# Patient Record
Sex: MALE | Race: WHITE | NOT HISPANIC OR LATINO | Employment: FULL TIME | URBAN - METROPOLITAN AREA
[De-identification: names, ages, dates, MRNs, and addresses within clinical notes are randomized per-mention and may not be internally consistent; named-entity substitution may affect disease eponyms.]

---

## 2019-05-22 ENCOUNTER — LAB VISIT (OUTPATIENT)
Dept: LAB | Facility: HOSPITAL | Age: 29
End: 2019-05-22
Payer: COMMERCIAL

## 2019-05-22 ENCOUNTER — OFFICE VISIT (OUTPATIENT)
Dept: GASTROENTEROLOGY | Facility: CLINIC | Age: 29
End: 2019-05-22
Payer: COMMERCIAL

## 2019-05-22 VITALS
HEIGHT: 70 IN | BODY MASS INDEX: 24.52 KG/M2 | SYSTOLIC BLOOD PRESSURE: 100 MMHG | DIASTOLIC BLOOD PRESSURE: 62 MMHG | WEIGHT: 171.31 LBS | HEART RATE: 64 BPM

## 2019-05-22 DIAGNOSIS — B18.2 CHRONIC HEPATITIS C WITHOUT HEPATIC COMA: Primary | ICD-10-CM

## 2019-05-22 DIAGNOSIS — B18.2 CHRONIC HEPATITIS C WITHOUT HEPATIC COMA: ICD-10-CM

## 2019-05-22 LAB
ALBUMIN SERPL BCP-MCNC: 4.2 G/DL (ref 3.5–5.2)
ALP SERPL-CCNC: 60 U/L (ref 55–135)
ALT SERPL W/O P-5'-P-CCNC: 133 U/L (ref 10–44)
AST SERPL-CCNC: 68 U/L (ref 10–40)
BILIRUB DIRECT SERPL-MCNC: 0.5 MG/DL (ref 0.1–0.3)
BILIRUB SERPL-MCNC: 0.8 MG/DL (ref 0.1–1)
INR PPP: 1 (ref 0.8–1.2)
PROT SERPL-MCNC: 7.9 G/DL (ref 6–8.4)
PROTHROMBIN TIME: 10.4 SEC (ref 9–12.5)

## 2019-05-22 PROCEDURE — 86706 HEP B SURFACE ANTIBODY: CPT

## 2019-05-22 PROCEDURE — 99204 PR OFFICE/OUTPT VISIT, NEW, LEVL IV, 45-59 MIN: ICD-10-PCS | Mod: S$GLB,,, | Performed by: NURSE PRACTITIONER

## 2019-05-22 PROCEDURE — 36415 COLL VENOUS BLD VENIPUNCTURE: CPT

## 2019-05-22 PROCEDURE — 86790 VIRUS ANTIBODY NOS: CPT

## 2019-05-22 PROCEDURE — 99204 OFFICE O/P NEW MOD 45 MIN: CPT | Mod: S$GLB,,, | Performed by: NURSE PRACTITIONER

## 2019-05-22 PROCEDURE — 99999 PR PBB SHADOW E&M-NEW PATIENT-LVL III: CPT | Mod: PBBFAC,,, | Performed by: NURSE PRACTITIONER

## 2019-05-22 PROCEDURE — 86704 HEP B CORE ANTIBODY TOTAL: CPT

## 2019-05-22 PROCEDURE — 3008F BODY MASS INDEX DOCD: CPT | Mod: CPTII,S$GLB,, | Performed by: NURSE PRACTITIONER

## 2019-05-22 PROCEDURE — 99999 PR PBB SHADOW E&M-NEW PATIENT-LVL III: ICD-10-PCS | Mod: PBBFAC,,, | Performed by: NURSE PRACTITIONER

## 2019-05-22 PROCEDURE — 87340 HEPATITIS B SURFACE AG IA: CPT

## 2019-05-22 PROCEDURE — 3008F PR BODY MASS INDEX (BMI) DOCUMENTED: ICD-10-PCS | Mod: CPTII,S$GLB,, | Performed by: NURSE PRACTITIONER

## 2019-05-22 PROCEDURE — 80076 HEPATIC FUNCTION PANEL: CPT

## 2019-05-22 PROCEDURE — 85610 PROTHROMBIN TIME: CPT

## 2019-05-22 NOTE — PROGRESS NOTES
Clinic Consult:  [facility] Consultation Note    Reason for Consult:  The encounter diagnosis was Chronic hepatitis C without hepatic coma.    PCP: Nicholas Barone   [address] [facility] / [place] [address]    HPI:  This is a 29 y.o. male here for evaluation of the above  Pt states that he was diagnosed with HCV about 4 years ago, no treatment at that time.  He reports a hx of IVDU. No use in over 2 years  He is interested in treatment.  Denies any abdominal pain.  No nausea or vomiting.  No change in bowel pattern.  No melena or hematochezia. No weight loss.  No upper GI bleeding.  No ascites or BLE.  No overt confusion.      Review of Systems   Constitutional: Negative for chills, fever, malaise/fatigue and weight loss.   Respiratory: Negative for cough.    Cardiovascular: Negative for chest pain.   Gastrointestinal:        Per HPI   Musculoskeletal: Negative for myalgias.   Skin: Negative for itching and rash.   Neurological: Negative for headaches.   Psychiatric/Behavioral: The patient is not nervous/anxious.        Medical History:   Past Medical History:   Diagnosis Date    Overdose of illicit drug     2 years clean        Surgical History:  Past Surgical History:   Procedure Laterality Date    HERNIA MESH REMOVAL  2006    LEG SURGERY  2015       Family History:   History reviewed. No pertinent family history.    Social History:   Social History     Tobacco Use    Smoking status: Current Every Day Smoker     Types: Vaping with nicotine    Smokeless tobacco: Former User   Substance Use Topics    Alcohol use: Yes     Comment: rarely    Drug use: Not Currently     Comment: 2 years clean        Allergies: Reviewed    Home Medications:   Current Outpatient Medications on File Prior to Visit   Medication Sig Dispense Refill    fluticasone (FLONASE) 50 mcg/actuation nasal spray 1 spray (50 mcg total) by Each Nare route once daily. 1 Bottle 11    hydrocortisone (ANUSOL-HC) 25 mg suppository Place  "1 suppository (25 mg total) rectally 2 (two) times daily. 14 suppository 0     No current facility-administered medications on file prior to visit.        Physical Exam:  Vital Signs:  /62   Pulse 64   Ht 5' 10" (1.778 m)   Wt 77.7 kg (171 lb 4.8 oz)   BMI 24.58 kg/m²   Body mass index is 24.58 kg/m².  Physical Exam   Constitutional: He is oriented to person, place, and time. He appears well-developed and well-nourished.   HENT:   Head: Normocephalic.   Eyes: No scleral icterus.   Neck: Normal range of motion.   Cardiovascular: Normal rate and regular rhythm.   Pulmonary/Chest: Effort normal and breath sounds normal.   Abdominal: Soft. Bowel sounds are normal. He exhibits no distension. There is no tenderness.   Musculoskeletal: Normal range of motion.   Neurological: He is alert and oriented to person, place, and time.   Skin: Skin is warm and dry.   Psychiatric: He has a normal mood and affect.   Vitals reviewed.      Labs: Pertinent labs reviewed.  Assessment:  1. Chronic hepatitis C without hepatic coma         Recommendations:  ,Chronic hepatitis C without hepatic coma  - diagnosis, prognosis, progression, and treatment of HCV discussed  - will plan for additional labs and imaging for staging of liver disease  - Mavyret for 8 weeks sent to pharmacy.   - If previous exposure to Hep B, will need labs every 4 weeks while on treatment.  - Will need end of treatment labs and SVR labs 12 weeks last.   - questions and concerns addressed.   -     Hepatic function panel; Future; Expected date: 05/22/2019  -     Protime-INR; Future; Expected date: 05/22/2019  -     Hepatitis B surface antibody; Future; Expected date: 05/22/2019  -     Hepatitis B surface antigen; Future; Expected date: 05/22/2019  -     Hepatitis B core antibody, total; Future; Expected date: 05/22/2019  -     Hepatitis A antibody, IgG; Future; Expected date: 05/22/2019  -     US Abdomen Complete; Future; Expected date: 05/22/2019  -     US " Elastography Liver; Future; Expected date: 05/22/2019  -     glecaprevir-pibrentasvir (MAVYRET) 100-40 mg Tab; Take 3 tablets by mouth once daily.  Dispense: 84 tablet; Refill: 1        Follow up to be determined by results of above.        Thank you so much for allowing me to participate in the care of GUERITA Jose

## 2019-05-22 NOTE — LETTER
May 22, 2019      Fabrice Soliz MD  [address]  [facility] [address]           [facility]  [address] [facility] [address]  Phone: [phone]  Fax: [phone]          Patient: Eusebio Kwan   MR Number: [id number]   YOB: 1990   Date of Visit: 5/22/2019       Dear Dr. Fabrice Soliz:    Thank you for referring Eusebio Kwan to me for evaluation. Attached you will find relevant portions of my assessment and plan of care.    If you have questions, please do not hesitate to call me. I look forward to following Eusebio Kwan along with you.    Sincerely,    Johnna Ashraf, [facility]    Enclosure  CC:  No Recipients    If you would like to receive this communication electronically, please contact [email or web address] or [phone] to request more information on [facility] Link access.    For providers and/or their staff who would like to refer a patient to [facility], please contact us through our one-stop-shop provider referral line, [facility], at [phone].    If you feel you have received this communication in error or would no longer like to receive these types of communications, please e-mail [email or web address]

## 2019-05-23 ENCOUNTER — TELEPHONE (OUTPATIENT)
Dept: PHARMACY | Facility: CLINIC | Age: 29
End: 2019-05-23

## 2019-05-23 LAB
HBV CORE AB SERPL QL IA: NEGATIVE
HBV SURFACE AB SER-ACNC: POSITIVE M[IU]/ML
HBV SURFACE AG SERPL QL IA: NEGATIVE
HEPATITIS A ANTIBODY, IGG: NEGATIVE

## 2019-05-23 NOTE — TELEPHONE ENCOUNTER
Notified the patient we received the prescription for Mavyret, and it will require authorization from the insurance company. Patient voiced understanding.

## 2019-05-24 DIAGNOSIS — Z23 NEED FOR VACCINATION AGAINST HEPATITIS A: Primary | ICD-10-CM

## 2019-05-30 ENCOUNTER — DOCUMENTATION ONLY (OUTPATIENT)
Dept: TRANSPLANT | Facility: CLINIC | Age: 29
End: 2019-05-30

## 2019-05-30 NOTE — NURSING
Pt records reviewed.   Pt will be referred to Hepatitis C clinic  Hepatitis C virus carrier state  Initial referral received  from the [facility]que.   Referring Provider/diagnosis  Nicholas Barone MD    Referral letter sent to  patient.    mikal.

## 2019-05-30 NOTE — LETTER
May 30, 2019    Eusebio Kwan  [address] [facility] [facility] [address]      Dear Eusebio Kwan:    Your doctor has referred you to the [facility]. We are sending this letter to remind you to make an appointment with us to complete the referral process.     Please call us at [phone] to schedule an appointment. We look forward to seeing you soon.     If you received a call and have been scheduled, please disregard this letter.       Sincerely,        [facility]   [address] [place] [id number]  [phone]

## 2019-05-31 ENCOUNTER — PROCEDURE VISIT (OUTPATIENT)
Dept: GASTROENTEROLOGY | Facility: CLINIC | Age: 29
End: 2019-05-31
Attending: NURSE PRACTITIONER
Payer: COMMERCIAL

## 2019-05-31 ENCOUNTER — HOSPITAL ENCOUNTER (OUTPATIENT)
Dept: RADIOLOGY | Facility: HOSPITAL | Age: 29
Discharge: HOME | End: 2019-05-31
Attending: NURSE PRACTITIONER
Payer: COMMERCIAL

## 2019-05-31 VITALS
WEIGHT: 173.5 LBS | HEART RATE: 66 BPM | DIASTOLIC BLOOD PRESSURE: 60 MMHG | SYSTOLIC BLOOD PRESSURE: 90 MMHG | BODY MASS INDEX: 24.84 KG/M2 | HEIGHT: 70 IN

## 2019-05-31 DIAGNOSIS — B18.2 CHRONIC HEPATITIS C WITHOUT HEPATIC COMA: ICD-10-CM

## 2019-05-31 PROCEDURE — 91200 LIVER ELASTOGRAPHY: CPT | Mod: S$GLB,,, | Performed by: NURSE PRACTITIONER

## 2019-05-31 PROCEDURE — 76700 US ABDOMEN COMPLETE: ICD-10-PCS | Mod: 26,,, | Performed by: RADIOLOGY

## 2019-05-31 PROCEDURE — 91200 PR LIVER ELASTOGRAPHY W/OUT IMAG W/INTERP & REPORT: ICD-10-PCS | Mod: S$GLB,,, | Performed by: NURSE PRACTITIONER

## 2019-05-31 PROCEDURE — 76700 US EXAM ABDOM COMPLETE: CPT | Mod: TC

## 2019-05-31 PROCEDURE — 76700 US EXAM ABDOM COMPLETE: CPT | Mod: 26,,, | Performed by: RADIOLOGY

## 2019-06-05 NOTE — TELEPHONE ENCOUNTER
DOCUMENTATION ONLY:  Prior authorization for Mavyret approved from 06/04/2019 to 12/01/2019 x 8 weeks of treatment.   Case ID# [id number]    [facility]  [phone] (Phone)  [phone] (Fax)    Mavyret co-pay coupon card information:   BIN# [id number]  PCN# [id number]  GRP# [id number]  ID# [id number]

## 2019-06-06 ENCOUNTER — TELEPHONE (OUTPATIENT)
Dept: GASTROENTEROLOGY | Facility: CLINIC | Age: 29
End: 2019-06-06

## 2019-06-06 DIAGNOSIS — B18.2 CHRONIC HEPATITIS C WITHOUT HEPATIC COMA: ICD-10-CM

## 2019-06-06 NOTE — TELEPHONE ENCOUNTER
"----- Message from Marshall Metz sent at 6/5/2019 10:14 AM CDT -----  Regarding: Mavyret - [facility]  BOUCHRA Pedro: Mavyret prior authorization has been approved through 12/01/2019 (x 8 weeks). Patients insurance requires the patient to fill through [facility]. Please send prescription to [facility], which has been added to patient EPIC profile. Patient has been notified and provided with the necessary information to call and schedule a delivery.     To complete this in EPIC, the original order MUST be discontinued and re-typed as a new prescription with the updated pharmacy listed. Clicking "reorder" will continue to route the prescription to [facility] even if the pharmacy is changed. Please OPT the patient out of [facility] when the BPA is fired.    Mavyret co-pay coupon card information:   BIN# [id number]  N# [id number]  GRP# [id number]  ID# [id number]    Thanks,  Marshall Metz  [phone]  "

## 2019-06-11 ENCOUNTER — TELEPHONE (OUTPATIENT)
Dept: PHARMACY | Facility: CLINIC | Age: 29
End: 2019-06-11

## 2019-06-11 NOTE — TELEPHONE ENCOUNTER
[facility] follow-up attempted to see if patient has started medication, no answer, could not leave [facility] follow-up.

## 2019-06-12 NOTE — PROCEDURES
Fibroscan Procedure     Name: Eusebio Kwan  Date of Procedure : 2019   :: NISHANT Burch  Diagnosis: HCV    Probe: M    Fibroscan readin.7 KPa    Fibrosis:F 0-1     CAP readin dB/m    Steatosis: :S1       Interpretation: No fibrosis.  Mild steatosis.

## 2019-06-18 ENCOUNTER — TELEPHONE (OUTPATIENT)
Dept: GASTROENTEROLOGY | Facility: CLINIC | Age: 29
End: 2019-06-18

## 2019-06-19 NOTE — TELEPHONE ENCOUNTER
"Patient reached.  He has received Mavyret.  He states he has spoken with "3 different people" regarding how to take it and feels very comfortable.  We reinforced the importance of adherence and remaining on 3 tablets by mouth daily with food.     Santiago Casey, R.Ph., [facility]  Clinical Pharmacist, HIV/HCV  [facility]  Phone: [phone]      "

## 2019-08-26 ENCOUNTER — LAB VISIT (OUTPATIENT)
Dept: LAB | Facility: HOSPITAL | Age: 29
End: 2019-08-26
Attending: NURSE PRACTITIONER
Payer: COMMERCIAL

## 2019-08-26 ENCOUNTER — OFFICE VISIT (OUTPATIENT)
Dept: GASTROENTEROLOGY | Facility: CLINIC | Age: 29
End: 2019-08-26
Payer: COMMERCIAL

## 2019-08-26 VITALS
DIASTOLIC BLOOD PRESSURE: 60 MMHG | HEART RATE: 88 BPM | HEIGHT: 70 IN | WEIGHT: 174.19 LBS | BODY MASS INDEX: 24.94 KG/M2 | SYSTOLIC BLOOD PRESSURE: 110 MMHG

## 2019-08-26 DIAGNOSIS — B18.2 CHRONIC HEPATITIS C WITHOUT HEPATIC COMA: Primary | ICD-10-CM

## 2019-08-26 DIAGNOSIS — B18.2 CHRONIC HEPATITIS C WITHOUT HEPATIC COMA: ICD-10-CM

## 2019-08-26 LAB
ALBUMIN SERPL BCP-MCNC: 3.9 G/DL (ref 3.5–5.2)
ALP SERPL-CCNC: 68 U/L (ref 55–135)
ALT SERPL W/O P-5'-P-CCNC: 13 U/L (ref 10–44)
AST SERPL-CCNC: 18 U/L (ref 10–40)
BILIRUB DIRECT SERPL-MCNC: 0.1 MG/DL (ref 0.1–0.3)
BILIRUB SERPL-MCNC: 0.3 MG/DL (ref 0.1–1)
INR PPP: 1 (ref 0.8–1.2)
PROT SERPL-MCNC: 7.2 G/DL (ref 6–8.4)
PROTHROMBIN TIME: 10.2 SEC (ref 9–12.5)

## 2019-08-26 PROCEDURE — 3008F BODY MASS INDEX DOCD: CPT | Mod: CPTII,S$GLB,, | Performed by: NURSE PRACTITIONER

## 2019-08-26 PROCEDURE — 87522 HEPATITIS C REVRS TRNSCRPJ: CPT

## 2019-08-26 PROCEDURE — 3008F PR BODY MASS INDEX (BMI) DOCUMENTED: ICD-10-PCS | Mod: CPTII,S$GLB,, | Performed by: NURSE PRACTITIONER

## 2019-08-26 PROCEDURE — 85610 PROTHROMBIN TIME: CPT

## 2019-08-26 PROCEDURE — 99213 OFFICE O/P EST LOW 20 MIN: CPT | Mod: S$GLB,,, | Performed by: NURSE PRACTITIONER

## 2019-08-26 PROCEDURE — 36415 COLL VENOUS BLD VENIPUNCTURE: CPT

## 2019-08-26 PROCEDURE — 99213 PR OFFICE/OUTPT VISIT, EST, LEVL III, 20-29 MIN: ICD-10-PCS | Mod: S$GLB,,, | Performed by: NURSE PRACTITIONER

## 2019-08-26 PROCEDURE — 99999 PR PBB SHADOW E&M-EST. PATIENT-LVL III: ICD-10-PCS | Mod: PBBFAC,,, | Performed by: NURSE PRACTITIONER

## 2019-08-26 PROCEDURE — 80076 HEPATIC FUNCTION PANEL: CPT

## 2019-08-26 PROCEDURE — 99999 PR PBB SHADOW E&M-EST. PATIENT-LVL III: CPT | Mod: PBBFAC,,, | Performed by: NURSE PRACTITIONER

## 2019-08-26 RX ORDER — TOLNAFTATE 10 MG/G
CREAM TOPICAL
COMMUNITY
Start: 2019-08-22 | End: 2020-03-18 | Stop reason: SDUPTHER

## 2019-08-26 NOTE — PROGRESS NOTES
Clinic Follow Up:  [facility] Follow Up Note    Reason for Follow Up:  The encounter diagnosis was Chronic hepatitis C without hepatic coma.    PCP: Nicholas Barone       HPI:  This is a 29 y.o. male here for follow up of the above  Pt states that he completed the Mavyret 2 weeks ago.  Completed medication without any side effects.   Denies any abdominal pain.  No nausea or vomiting.  No change in bowel pattern.  No melena or hematochezia. No weight loss.  No upper GI bleeding.  No ascites or BLE.  No overt confusion.  Fibroscan showed no significant fibrosis.     Review of Systems   Constitutional: Negative for chills, fever, malaise/fatigue and weight loss.   Respiratory: Negative for cough.    Cardiovascular: Negative for chest pain.   Gastrointestinal:        Per HPI   Musculoskeletal: Negative for myalgias.   Skin: Negative for itching and rash.   Neurological: Negative for headaches.   Psychiatric/Behavioral: The patient is not nervous/anxious.        Medical History:  Past Medical History:   Diagnosis Date    Overdose of illicit drug     2 years clean        Surgical History:   Past Surgical History:   Procedure Laterality Date    HERNIA MESH REMOVAL  2006    LEG SURGERY  2015       Family History:   No family history on file.    Social History:   Social History     Tobacco Use    Smoking status: Current Every Day Smoker     Types: Vaping with nicotine    Smokeless tobacco: Former User   Substance Use Topics    Alcohol use: Not Currently     Comment: rarely    Drug use: Not Currently     Comment: 2 years clean        Allergies: Reviewed    Home Medications:  Current Outpatient Medications on File Prior to Visit   Medication Sig Dispense Refill    [DISCONTINUED] glecaprevir-pibrentasvir (MAVYRET) 100-40 mg Tab Take 3 tablets by mouth once daily. 84 tablet 1    fluticasone (FLONASE) 50 mcg/actuation nasal spray 1 spray (50 mcg total) by Each Nare route once daily. 1 Bottle 11     "hydrocortisone (ANUSOL-HC) 25 mg suppository Place 1 suppository (25 mg total) rectally 2 (two) times daily. 14 suppository 0    tolnaftate (TINACTIN) 1 % cream Apply topically.       No current facility-administered medications on file prior to visit.        Physical Exam:  Vital Signs:  /60   Pulse 88   Ht 5' 10" (1.778 m)   Wt 79 kg (174 lb 2.6 oz)   BMI 24.99 kg/m²   Body mass index is 24.99 kg/m².  Physical Exam   Constitutional: He is oriented to person, place, and time. He appears well-developed.   HENT:   Head: Normocephalic.   Neck: Normal range of motion.   Cardiovascular: Normal rate and regular rhythm.   Pulmonary/Chest: Effort normal and breath sounds normal.   Abdominal: Soft. Bowel sounds are normal. He exhibits no distension. There is no tenderness.   Musculoskeletal: Normal range of motion.   Neurological: He is alert and oriented to person, place, and time.   Skin: Skin is warm and dry.   Psychiatric: He has a normal mood and affect.   Vitals reviewed.      Labs: Pertinent labs reviewed.  Assessment:  1. Chronic hepatitis C without hepatic coma        Recommendations:  - stable without any new complaints  - will get end of treatment labs.   - If HCV quant is negative, repeat labs in 12 weeks for SVR  - If SVR is reached, then no additional follow up will be needed for liver.       Return to Clinic:  Follow up to be determined by results of above.    "

## 2019-08-28 LAB
HCV RNA SERPL NAA+PROBE-LOG IU: <1.08 LOG (10) IU/ML
HCV RNA SERPL QL NAA+PROBE: NOT DETECTED IU/ML
HCV RNA SPEC NAA+PROBE-ACNC: <12 IU/ML